# Patient Record
Sex: MALE | Race: ASIAN | Employment: OTHER | URBAN - METROPOLITAN AREA
[De-identification: names, ages, dates, MRNs, and addresses within clinical notes are randomized per-mention and may not be internally consistent; named-entity substitution may affect disease eponyms.]

---

## 2017-11-07 ENCOUNTER — OFFICE VISIT (OUTPATIENT)
Dept: FAMILY MEDICINE CLINIC | Facility: CLINIC | Age: 72
End: 2017-11-07

## 2017-11-07 VITALS
HEIGHT: 69.69 IN | DIASTOLIC BLOOD PRESSURE: 82 MMHG | SYSTOLIC BLOOD PRESSURE: 132 MMHG | OXYGEN SATURATION: 95 % | HEART RATE: 113 BPM | WEIGHT: 179 LBS | BODY MASS INDEX: 25.92 KG/M2

## 2017-11-07 DIAGNOSIS — Z12.11 SCREENING FOR COLON CANCER: ICD-10-CM

## 2017-11-07 DIAGNOSIS — M1A.09X0 IDIOPATHIC CHRONIC GOUT OF MULTIPLE SITES WITHOUT TOPHUS: ICD-10-CM

## 2017-11-07 DIAGNOSIS — R73.01 IMPAIRED FASTING GLUCOSE: Primary | ICD-10-CM

## 2017-11-07 DIAGNOSIS — Z13.6 SCREENING FOR CARDIOVASCULAR CONDITION: ICD-10-CM

## 2017-11-07 DIAGNOSIS — Z00.00 ENCOUNTER FOR ANNUAL HEALTH EXAMINATION: ICD-10-CM

## 2017-11-07 DIAGNOSIS — N18.30 CHRONIC RENAL FAILURE, STAGE 3 (MODERATE) (HCC): ICD-10-CM

## 2017-11-07 DIAGNOSIS — Z51.81 MEDICATION MONITORING ENCOUNTER: ICD-10-CM

## 2017-11-07 PROCEDURE — 99213 OFFICE O/P EST LOW 20 MIN: CPT | Performed by: FAMILY MEDICINE

## 2017-11-07 PROCEDURE — G0438 PPPS, INITIAL VISIT: HCPCS | Performed by: FAMILY MEDICINE

## 2017-11-07 NOTE — PATIENT INSTRUCTIONS
Recommend blood tests for cholesterol, sugar, uric acid, liver & kidneys, blood count. FIT (fecal or stool occult blood test) once a year for at least 3 years to screen for colon cancer.   Pneumovax (pneumonia booster) next year    With vegetarian diet and is not a screening covered service except at the Welcome to Medicare Visit    Abdominal aortic aneurysm screening (once between ages 73-68)  No results found for this or any previous visit.  Limited to patients who meet one of the following criteria:   • Me End-stage renal disease   Hemophiliacs who received Factor VIII or IX concentrates   Clients of institutions for the mentally retarded   Persons who live in the same house as a HepB virus carrier   Homosexual men   Illicit injectable drug abusers     Tet

## 2017-11-07 NOTE — PROGRESS NOTES
HPI:   Cesario Bowles is a 67year old male who presents for a Medicare Initial Annual Wellness visit (Once after 12 month Medicare anniversary) .     Recently back from [place], gout when out of town, starts allopurinol 1-2 weeks before travel, averages 9/8/15); and cataract (Left, 12/8/15). His family history is not on file. SOCIAL HISTORY:   He  reports that he has quit smoking. He has never used smokeless tobacco. He reports that he drinks alcohol. He reports that he does not use drugs.      REVIEW midline, mucosa normal, no drainage or sinus tenderness   Throat: Lips, mucosa, and tongue normal; teeth and gums normal   Neck: Supple, symmetrical, trachea midline, no adenopathy, thyroid: not enlarged, symmetric, no tenderness/mass/nodules, no carotid b LIPID PANEL; Future    Encounter for annual health examination    Screening for colon cancer  -     OCCULT BLOOD, FECAL, IMMUNOASSAY; Future         Mr. Vijay Moon does not currently take aspirin. We discussed the risks and benefits of aspirin therapy.    Sh Functional Status     Hearing Problems?: No    Vision Problems? : No    Difficulty walking?: No    Difficulty dressing or bathing?: No    Problems with daily activities? : No    Memory Problems?: No      Fall/Risk Assessment          Have you fallen in or Procedure   Diabetes Screening      HbgA1C     At Least  Annually for Diabetics No results found for: A1C No flowsheet data found.     Fasting Blood Sugar (FSB)   Patient must be diagnosed with one of the following:   • Hypertension   • Dyslipidemia   • No results found for: FOB, OCCULTSTOOL No flowsheet data found.      Barium Enema-   uncomfortable but covered  Covered but uncomfortable   Glaucoma Screening      Ophthalmology Visit   Covered annually for Diabetics, people with Glaucoma family history,  A

## 2017-11-08 RX ORDER — INFLUENZA A VIRUSA/MICHIGAN/45/2015 X-275 (H1N1) ANTIGEN (FORMALDEHYDE INACTIVATED), INFLUENZA A VIRUS A/HONG KONG/4801/2014 X-263B (H3N2) ANTIGEN (FORMALDEHYDE INACTIVATED), AND INFLUENZA B VIRUS B/BRISBANE/60/2008 ANTIGEN (FORMALDEHYDE INACTIVATED) 60; 60; 60 UG/.5ML; UG/.5ML; UG/.5ML
INJECTION, SUSPENSION INTRAMUSCULAR
Refills: 0 | COMMUNITY
Start: 2017-09-11 | End: 2017-11-08 | Stop reason: ALTCHOICE

## 2017-11-08 RX ORDER — PNEUMOCOCCAL 13-VALENT CONJUGATE VACCINE 2.2; 2.2; 2.2; 2.2; 2.2; 4.4; 2.2; 2.2; 2.2; 2.2; 2.2; 2.2; 2.2 UG/.5ML; UG/.5ML; UG/.5ML; UG/.5ML; UG/.5ML; UG/.5ML; UG/.5ML; UG/.5ML; UG/.5ML; UG/.5ML; UG/.5ML; UG/.5ML; UG/.5ML
INJECTION, SUSPENSION INTRAMUSCULAR
Refills: 0 | COMMUNITY
Start: 2017-09-11 | End: 2017-11-08 | Stop reason: ALTCHOICE

## 2018-01-02 ENCOUNTER — LAB ENCOUNTER (OUTPATIENT)
Dept: LAB | Facility: HOSPITAL | Age: 73
End: 2018-01-02
Attending: FAMILY MEDICINE
Payer: MEDICARE

## 2018-01-02 DIAGNOSIS — N18.30 CHRONIC RENAL FAILURE, STAGE 3 (MODERATE) (HCC): ICD-10-CM

## 2018-01-02 DIAGNOSIS — Z13.6 SCREENING FOR CARDIOVASCULAR CONDITION: ICD-10-CM

## 2018-01-02 DIAGNOSIS — R73.01 IMPAIRED FASTING GLUCOSE: ICD-10-CM

## 2018-01-02 DIAGNOSIS — M1A.09X0 IDIOPATHIC CHRONIC GOUT OF MULTIPLE SITES WITHOUT TOPHUS: ICD-10-CM

## 2018-01-02 LAB
ALBUMIN SERPL-MCNC: 3.5 G/DL (ref 3.5–4.8)
ALP LIVER SERPL-CCNC: 72 U/L (ref 45–117)
ALT SERPL-CCNC: 25 U/L (ref 17–63)
AST SERPL-CCNC: 20 U/L (ref 15–41)
BASOPHILS # BLD AUTO: 0.08 X10(3) UL (ref 0–0.1)
BASOPHILS NFR BLD AUTO: 1.1 %
BILIRUB SERPL-MCNC: 1.1 MG/DL (ref 0.1–2)
BUN BLD-MCNC: 13 MG/DL (ref 8–20)
CALCIUM BLD-MCNC: 9.3 MG/DL (ref 8.3–10.3)
CHLORIDE: 107 MMOL/L (ref 101–111)
CHOLEST SMN-MCNC: 214 MG/DL (ref ?–200)
CO2: 27 MMOL/L (ref 22–32)
CREAT BLD-MCNC: 1.55 MG/DL (ref 0.7–1.3)
EOSINOPHIL # BLD AUTO: 0.27 X10(3) UL (ref 0–0.3)
EOSINOPHIL NFR BLD AUTO: 3.7 %
ERYTHROCYTE [DISTWIDTH] IN BLOOD BY AUTOMATED COUNT: 12.9 % (ref 11.5–16)
GLUCOSE BLD-MCNC: 101 MG/DL (ref 70–99)
HCT VFR BLD AUTO: 44 % (ref 37–53)
HDLC SERPL-MCNC: 44 MG/DL (ref 45–?)
HDLC SERPL: 4.86 {RATIO} (ref ?–4.97)
HGB BLD-MCNC: 14.5 G/DL (ref 13–17)
IMMATURE GRANULOCYTE COUNT: 0.03 X10(3) UL (ref 0–1)
IMMATURE GRANULOCYTE RATIO %: 0.4 %
LDLC SERPL CALC-MCNC: 125 MG/DL (ref ?–130)
LYMPHOCYTES # BLD AUTO: 2.37 X10(3) UL (ref 0.9–4)
LYMPHOCYTES NFR BLD AUTO: 32.7 %
M PROTEIN MFR SERPL ELPH: 7.7 G/DL (ref 6.1–8.3)
MCH RBC QN AUTO: 31.3 PG (ref 27–33.2)
MCHC RBC AUTO-ENTMCNC: 33 G/DL (ref 31–37)
MCV RBC AUTO: 94.8 FL (ref 80–99)
MONOCYTES # BLD AUTO: 0.68 X10(3) UL (ref 0.1–0.6)
MONOCYTES NFR BLD AUTO: 9.4 %
NEUTROPHIL ABS PRELIM: 3.81 X10 (3) UL (ref 1.3–6.7)
NEUTROPHILS # BLD AUTO: 3.81 X10(3) UL (ref 1.3–6.7)
NEUTROPHILS NFR BLD AUTO: 52.7 %
NONHDLC SERPL-MCNC: 170 MG/DL (ref ?–130)
PLATELET # BLD AUTO: 249 10(3)UL (ref 150–450)
POTASSIUM SERPL-SCNC: 4.5 MMOL/L (ref 3.6–5.1)
RBC # BLD AUTO: 4.64 X10(6)UL (ref 3.8–5.8)
RED CELL DISTRIBUTION WIDTH-SD: 44.4 FL (ref 35.1–46.3)
SODIUM SERPL-SCNC: 141 MMOL/L (ref 136–144)
TRIGL SERPL-MCNC: 225 MG/DL (ref ?–150)
URIC ACID: 7.2 MG/DL (ref 2.4–8.7)
VLDLC SERPL CALC-MCNC: 45 MG/DL (ref 5–40)
WBC # BLD AUTO: 7.2 X10(3) UL (ref 4–13)

## 2018-01-02 PROCEDURE — 80053 COMPREHEN METABOLIC PANEL: CPT

## 2018-01-02 PROCEDURE — 84550 ASSAY OF BLOOD/URIC ACID: CPT

## 2018-01-02 PROCEDURE — 85025 COMPLETE CBC W/AUTO DIFF WBC: CPT

## 2018-01-02 PROCEDURE — 36415 COLL VENOUS BLD VENIPUNCTURE: CPT

## 2018-01-02 PROCEDURE — 80061 LIPID PANEL: CPT

## 2018-01-19 ENCOUNTER — LAB ENCOUNTER (OUTPATIENT)
Dept: LAB | Facility: HOSPITAL | Age: 73
End: 2018-01-19
Attending: FAMILY MEDICINE
Payer: MEDICARE

## 2018-01-19 DIAGNOSIS — Z12.11 SCREENING FOR COLON CANCER: ICD-10-CM

## 2018-01-19 PROCEDURE — 82270 OCCULT BLOOD FECES: CPT

## 2018-01-19 PROCEDURE — 82272 OCCULT BLD FECES 1-3 TESTS: CPT

## 2018-02-06 ENCOUNTER — TELEPHONE (OUTPATIENT)
Dept: FAMILY MEDICINE CLINIC | Facility: CLINIC | Age: 73
End: 2018-02-06

## 2019-05-14 ENCOUNTER — OFFICE VISIT (OUTPATIENT)
Dept: FAMILY MEDICINE CLINIC | Facility: CLINIC | Age: 74
End: 2019-05-14
Payer: MEDICARE

## 2019-05-14 VITALS
DIASTOLIC BLOOD PRESSURE: 84 MMHG | RESPIRATION RATE: 16 BRPM | BODY MASS INDEX: 25.24 KG/M2 | HEART RATE: 71 BPM | WEIGHT: 176.31 LBS | SYSTOLIC BLOOD PRESSURE: 138 MMHG | TEMPERATURE: 98 F | HEIGHT: 70 IN | OXYGEN SATURATION: 99 %

## 2019-05-14 DIAGNOSIS — M1A.0711 IDIOPATHIC CHRONIC GOUT OF RIGHT FOOT WITH TOPHUS: Primary | ICD-10-CM

## 2019-05-14 PROBLEM — M1A.0791 IDIOPATHIC CHRONIC GOUT OF FOOT WITH TOPHUS: Status: ACTIVE | Noted: 2017-11-07

## 2019-05-14 PROCEDURE — 99213 OFFICE O/P EST LOW 20 MIN: CPT | Performed by: FAMILY MEDICINE

## 2019-05-14 RX ORDER — COLCHICINE 0.6 MG/1
TABLET ORAL
Qty: 3 TABLET | Refills: 1 | Status: SHIPPED | OUTPATIENT
Start: 2019-05-14 | End: 2019-05-22

## 2019-05-14 NOTE — PATIENT INSTRUCTIONS
Colchicine 2 pills once and another tablet 1 hour later. Referral to Dr. Lyn Ayala to discuss treatment of gout and prevention of severe attacks. Treating Gout Attacks     Raising the joint above the level of your heart can help reduce gout symptoms. do:  · Don't eat foods high in purines  ? Certain meats (red meat, processed meat, turkey)  ? Organ meats (kidney, liver, sweetbread)  ? Shellfish (lobster, crab, shrimp, scallop, mussel)  ?  Certain fish (anchovy, sardine, herring, mackerel)  · Take any me and gravies made with meat  · Organ meats (such as liver, kidneys, sweetbreads, and tripe)  · Legumes (such as dried beans and peas)  · Mushrooms, spinach, asparagus, and cauliflower  · Yeast and yeast extract supplements  Foods to try  Some foods may be h

## 2019-05-14 NOTE — PROGRESS NOTES
Joseph Saucedo IS A 68year old male HERE FOR Patient presents with:  Gout: F/U. x2 months        History of present illness:     Had attack 4 months ago of gout in January. Started when he was here. Swelling developed and has persisted since March. Financial resource strain: Not on file      Food insecurity:        Worry: Not on file        Inability: Not on file      Transportation needs:        Medical: Not on file        Non-medical: Not on file    Tobacco Use      Smoking status: Former Smoke A fib was 6 years ao with emergency surgery for gastric ulcer perforated at [facility]. Caused by INdomethacin taken for gout.      Exam:     /84   Pulse 71   Temp 98.2 °F (36.8 °C) (Oral)   Resp 16   Ht 70\"   Wt 176 lb 4.8 oz   SpO2 99%   BMI Gout is a disease that affects the joints. It is caused by excess uric acid in your blood that may lead to crystals forming in your joints. Left untreated, it can lead to painful foot and joint deformities and even kidney problems.  But, by treating gout ea · Take any medicines prescribed by your healthcare provider. · Lose weight if you need to. · Reduce high fructose corn syrup in meals and drinks. · Reduce or cut out alcohol, particularly beer, but also red wine and spirits.   · Control blood pressure, d Some foods may be helpful for people with gout. You may want to try adding some of the following foods to your diet:  · Dark berries. These include blueberries, blackberries, and cherries. These berries contain chemicals that may lower uric acid. · Tofu.

## 2019-05-15 ENCOUNTER — TELEPHONE (OUTPATIENT)
Dept: FAMILY MEDICINE CLINIC | Facility: CLINIC | Age: 74
End: 2019-05-15

## 2019-05-15 ENCOUNTER — LAB ENCOUNTER (OUTPATIENT)
Dept: LAB | Facility: HOSPITAL | Age: 74
End: 2019-05-15
Attending: FAMILY MEDICINE
Payer: MEDICARE

## 2019-05-15 DIAGNOSIS — M1A.0711 IDIOPATHIC CHRONIC GOUT OF RIGHT FOOT WITH TOPHUS: ICD-10-CM

## 2019-05-15 PROCEDURE — 84550 ASSAY OF BLOOD/URIC ACID: CPT

## 2019-05-15 PROCEDURE — 36415 COLL VENOUS BLD VENIPUNCTURE: CPT

## 2019-05-15 PROCEDURE — 80053 COMPREHEN METABOLIC PANEL: CPT

## 2019-05-15 PROCEDURE — 85025 COMPLETE CBC W/AUTO DIFF WBC: CPT

## 2019-05-15 NOTE — TELEPHONE ENCOUNTER
Returned call to patient. He states he was surprised at the way the colchicine was ordered. He does not think it is going to help him to take 2 pills followed by 1 pill an hour later. States he previously took colchicine 3 pills a day x10 days.  Reviewed

## 2019-05-16 RX ORDER — COLCHICINE 0.6 MG/1
0.6 TABLET ORAL 2 TIMES DAILY
Qty: 60 TABLET | Refills: 1 | Status: SHIPPED | OUTPATIENT
Start: 2019-05-16 | End: 2019-05-22

## 2019-05-16 NOTE — TELEPHONE ENCOUNTER
The current recommendation for acute gout (which he does not have particularly, but has had persistent aching and swelling) is 2 pills once followed by a pill an hour later.  I had wante to try the one-time dose first. It has less nausea and toxicity with i

## 2019-05-20 ENCOUNTER — PATIENT MESSAGE (OUTPATIENT)
Dept: FAMILY MEDICINE CLINIC | Facility: CLINIC | Age: 74
End: 2019-05-20

## 2019-05-20 DIAGNOSIS — E79.0 HYPERURICEMIA: Primary | ICD-10-CM

## 2019-05-20 DIAGNOSIS — M1A.9XX1 TOPHACEOUS GOUT: ICD-10-CM

## 2019-05-20 NOTE — PROGRESS NOTES
Uloric is similar to allopurinol and might cause acute attack similarly. Please tell pt I'll forward a query to the rheumatologist asking how his case should be handled.  Let him know I've been out of office for personal reasons and not able to check messag

## 2019-05-20 NOTE — TELEPHONE ENCOUNTER
Spoke to patient and reviewed information per Dr. Brito Mon note. He still has questions. See [employer] message 5/20/19.

## 2019-05-20 NOTE — TELEPHONE ENCOUNTER
Dr. Barbara Aguilera,  Please advise    I spoke to patient and based on your response regarding potential toxicity of colchicine, he was hesitant to take it BID for 30 days. Per pharmacy, it will cost approx. $320 out of pocket for the one month supply.  Wants to ve

## 2019-05-20 NOTE — TELEPHONE ENCOUNTER
From: Neha Wood  To: Lakia Vega MD  Sent: 5/20/2019 8:19 AM CDT  Subject: Visit Follow-up Question    I had left a message on Friday morning for a call-back from a nurse; but I did not get any call.  Please call me back - [place]

## 2019-05-22 RX ORDER — COLCHICINE 0.6 MG/1
0.3 TABLET ORAL DAILY
Qty: 30 TABLET | Refills: 1 | COMMUNITY
Start: 2019-05-22 | End: 2019-12-10

## 2019-05-29 ENCOUNTER — PATIENT MESSAGE (OUTPATIENT)
Dept: FAMILY MEDICINE CLINIC | Facility: CLINIC | Age: 74
End: 2019-05-29

## 2019-05-29 DIAGNOSIS — M10.00 IDIOPATHIC GOUT, UNSPECIFIED CHRONICITY, UNSPECIFIED SITE: Primary | ICD-10-CM

## 2019-05-29 DIAGNOSIS — N18.30 CHRONIC RENAL FAILURE, STAGE 3 (MODERATE) (HCC): ICD-10-CM

## 2019-05-29 NOTE — TELEPHONE ENCOUNTER
From: Jeronimo People  Sent: 5/29/2019 2:47 PM CDT  To: [facility] Clinical Staff  Subject: RE:final recommendation from Dr. Nahed Lopes, similar to what I told you to do. Dr. Harper No: Thanks for the follow-up.  Yes, I have been taking 150 mg Alopurinol; wi

## 2019-06-04 ENCOUNTER — TELEPHONE (OUTPATIENT)
Dept: FAMILY MEDICINE CLINIC | Facility: CLINIC | Age: 74
End: 2019-06-04

## 2019-06-12 NOTE — TELEPHONE ENCOUNTER
Dr. Sejal Pretty,  [facility]    I do not see report from Dr. Enzo Aguilar in the chart. Have you received it?

## 2019-08-13 ENCOUNTER — APPOINTMENT (OUTPATIENT)
Dept: LAB | Facility: HOSPITAL | Age: 74
End: 2019-08-13
Attending: FAMILY MEDICINE
Payer: MEDICARE

## 2019-08-13 DIAGNOSIS — M1A.9XX1 TOPHACEOUS GOUT: ICD-10-CM

## 2019-08-13 DIAGNOSIS — M10.00 IDIOPATHIC GOUT, UNSPECIFIED CHRONICITY, UNSPECIFIED SITE: ICD-10-CM

## 2019-08-13 DIAGNOSIS — E79.0 HYPERURICEMIA: ICD-10-CM

## 2019-08-13 DIAGNOSIS — N18.30 CHRONIC RENAL FAILURE, STAGE 3 (MODERATE) (HCC): ICD-10-CM

## 2019-08-13 LAB
CREAT BLD-MCNC: 1.67 MG/DL (ref 0.7–1.3)
URATE SERPL-MCNC: 4.4 MG/DL (ref 3.5–7.2)

## 2019-08-13 PROCEDURE — 36415 COLL VENOUS BLD VENIPUNCTURE: CPT

## 2019-08-13 PROCEDURE — 84550 ASSAY OF BLOOD/URIC ACID: CPT

## 2019-08-13 PROCEDURE — 82565 ASSAY OF CREATININE: CPT

## 2019-11-25 ENCOUNTER — TELEPHONE (OUTPATIENT)
Dept: FAMILY MEDICINE CLINIC | Facility: CLINIC | Age: 74
End: 2019-11-25

## 2019-11-25 DIAGNOSIS — M1A.0791 IDIOPATHIC CHRONIC GOUT OF FOOT WITH TOPHUS, UNSPECIFIED LATERALITY: Primary | ICD-10-CM

## 2019-11-25 DIAGNOSIS — N18.30 CHRONIC RENAL FAILURE, STAGE 3 (MODERATE) (HCC): ICD-10-CM

## 2019-11-25 NOTE — TELEPHONE ENCOUNTER
Pt called stating he has an appointment with a Urologist in December. He is requesting orders to be entered for \"Creatinine Serum & Uric Acid\". Wants it done so results can be compared to  the August blood draw.

## 2019-11-25 NOTE — TELEPHONE ENCOUNTER
Dr. Erika Beth,  Please advise    LOV  5/14/19    Last Lab 8/13/19    Future Appointments   Date Time Provider Peter Hannon   12/10/2019  1:45 PM Tor Samuel, DO [facility]

## 2019-12-03 ENCOUNTER — APPOINTMENT (OUTPATIENT)
Dept: LAB | Facility: HOSPITAL | Age: 74
End: 2019-12-03
Attending: FAMILY MEDICINE
Payer: MEDICARE

## 2019-12-03 DIAGNOSIS — M1A.0791 IDIOPATHIC CHRONIC GOUT OF FOOT WITH TOPHUS, UNSPECIFIED LATERALITY: ICD-10-CM

## 2019-12-03 DIAGNOSIS — N18.30 CHRONIC RENAL FAILURE, STAGE 3 (MODERATE) (HCC): ICD-10-CM

## 2019-12-03 PROCEDURE — 80048 BASIC METABOLIC PNL TOTAL CA: CPT

## 2019-12-03 PROCEDURE — 36415 COLL VENOUS BLD VENIPUNCTURE: CPT

## 2019-12-03 PROCEDURE — 84550 ASSAY OF BLOOD/URIC ACID: CPT

## 2019-12-10 ENCOUNTER — OFFICE VISIT (OUTPATIENT)
Dept: RHEUMATOLOGY | Facility: CLINIC | Age: 74
End: 2019-12-10
Payer: MEDICARE

## 2019-12-10 VITALS
TEMPERATURE: 98 F | BODY MASS INDEX: 25 KG/M2 | SYSTOLIC BLOOD PRESSURE: 124 MMHG | HEART RATE: 72 BPM | RESPIRATION RATE: 16 BRPM | WEIGHT: 175 LBS | DIASTOLIC BLOOD PRESSURE: 82 MMHG

## 2019-12-10 DIAGNOSIS — E79.0 HYPERURICEMIA: ICD-10-CM

## 2019-12-10 DIAGNOSIS — N18.9 CHRONIC KIDNEY DISEASE, UNSPECIFIED CKD STAGE: ICD-10-CM

## 2019-12-10 DIAGNOSIS — M1A.9XX1 TOPHACEOUS GOUT: Primary | ICD-10-CM

## 2019-12-10 PROCEDURE — 99204 OFFICE O/P NEW MOD 45 MIN: CPT | Performed by: INTERNAL MEDICINE

## 2019-12-10 NOTE — PROGRESS NOTES
RHEUMATOLOGY NEW PATIENT   Date of visit: 12/10/2019  ? Patient presents with:  Establish Care: NP ref by PCP for gout. Pt states 'has first gout attack in 1987. No meat, sea food. '' No fam hx autoimmune diseases      ASSESSMENT, DISCUSSION & PLAN   As cysts.  This may be contributing to his ability to renally excrete uric acid. We will get updated uric acid levels in 1 month as well as renal function.   Depending on test results will likely recommend staying on chronic urate lowering therapy, amadeo the prednisone. States the January flare took about 3 weeks to subside. Has been flare free since that time. Since getting his last set of blood tests, showing improvement of uric acid, pt self discontinued Uloric.  Stopped allopurinol around August m Medications    No medications on file     Medications Discontinued During This Encounter  colchicine 0.6 MG Oral Tab             ? ?  Allergies:    Nsaids                    ? REVIEW OF SYSTEMS   ?   Review of Systems   Constitutional: Negative for chills intact distal pulses. No murmur heard. Pulmonary/Chest: Effort normal and breath sounds normal. No respiratory distress. He has no wheezes. Abdominal: Soft. He exhibits no distension. Musculoskeletal:         General: Deformity present.  No tenderness abdominal aorta tapers to the bifurcation. No retroperitoneal adenopathy.  Scattered atherosclerosis     PELVIS:  Not included     OTHER:  Lumbar scoliosis and spondyloarthritis, most severe at the lower lumbar levels    Small hiatal hernia.           =====

## 2020-01-07 ENCOUNTER — TELEPHONE (OUTPATIENT)
Dept: RHEUMATOLOGY | Facility: CLINIC | Age: 75
End: 2020-01-07

## 2020-01-07 ENCOUNTER — APPOINTMENT (OUTPATIENT)
Dept: LAB | Facility: HOSPITAL | Age: 75
End: 2020-01-07
Attending: INTERNAL MEDICINE
Payer: MEDICARE

## 2020-01-07 DIAGNOSIS — M1A.9XX1 TOPHACEOUS GOUT: Primary | ICD-10-CM

## 2020-01-07 DIAGNOSIS — M1A.9XX1 TOPHACEOUS GOUT: ICD-10-CM

## 2020-01-07 DIAGNOSIS — E79.0 HYPERURICEMIA: ICD-10-CM

## 2020-01-07 DIAGNOSIS — N18.9 CHRONIC KIDNEY DISEASE, UNSPECIFIED CKD STAGE: ICD-10-CM

## 2020-01-07 LAB
CREAT BLD-MCNC: 1.62 MG/DL (ref 0.7–1.3)
URATE SERPL-MCNC: 8 MG/DL (ref 3.5–7.2)

## 2020-01-07 PROCEDURE — 82565 ASSAY OF CREATININE: CPT

## 2020-01-07 PROCEDURE — 84550 ASSAY OF BLOOD/URIC ACID: CPT

## 2020-01-07 PROCEDURE — 36415 COLL VENOUS BLD VENIPUNCTURE: CPT

## 2020-01-07 NOTE — TELEPHONE ENCOUNTER
Called patient and notified of test results. Serum uric acid is elevated at 8.0. Reminded patient as discussed at his last visit that I would recommend urate lowering therapy at this level of hyperuricemia.   Reminded patient of risks and benefits of

## 2020-01-23 ENCOUNTER — OFFICE VISIT (OUTPATIENT)
Dept: FAMILY MEDICINE CLINIC | Facility: CLINIC | Age: 75
End: 2020-01-23
Payer: MEDICARE

## 2020-01-23 VITALS
RESPIRATION RATE: 16 BRPM | TEMPERATURE: 98 F | DIASTOLIC BLOOD PRESSURE: 96 MMHG | HEIGHT: 69.5 IN | WEIGHT: 179 LBS | BODY MASS INDEX: 25.92 KG/M2 | HEART RATE: 73 BPM | SYSTOLIC BLOOD PRESSURE: 144 MMHG | OXYGEN SATURATION: 99 %

## 2020-01-23 DIAGNOSIS — M10.40 OTHER SECONDARY ACUTE GOUT, UNSPECIFIED SITE: ICD-10-CM

## 2020-01-23 DIAGNOSIS — Z86.69 HISTORY OF RETINAL DETACHMENT: ICD-10-CM

## 2020-01-23 DIAGNOSIS — N28.1 RENAL CYSTS, ACQUIRED, BILATERAL: ICD-10-CM

## 2020-01-23 DIAGNOSIS — R73.01 IMPAIRED FASTING GLUCOSE: ICD-10-CM

## 2020-01-23 DIAGNOSIS — N18.30 CHRONIC RENAL FAILURE, STAGE 3 (MODERATE) (HCC): ICD-10-CM

## 2020-01-23 DIAGNOSIS — Z00.00 ENCOUNTER FOR ANNUAL HEALTH EXAMINATION: Primary | ICD-10-CM

## 2020-01-23 DIAGNOSIS — Z86.79 HISTORY OF VENTRICULAR TACHYCARDIA: ICD-10-CM

## 2020-01-23 DIAGNOSIS — Z87.11 HISTORY OF PEPTIC ULCER DISEASE: ICD-10-CM

## 2020-01-23 DIAGNOSIS — M1A.0791 IDIOPATHIC CHRONIC GOUT OF FOOT WITH TOPHUS, UNSPECIFIED LATERALITY: ICD-10-CM

## 2020-01-23 DIAGNOSIS — Z86.79 HISTORY OF ATRIAL FIBRILLATION: ICD-10-CM

## 2020-01-23 DIAGNOSIS — Z12.5 PROSTATE CANCER SCREENING: ICD-10-CM

## 2020-01-23 DIAGNOSIS — Z13.6 SCREENING FOR CARDIOVASCULAR CONDITION: ICD-10-CM

## 2020-01-23 PROCEDURE — G0439 PPPS, SUBSEQ VISIT: HCPCS | Performed by: FAMILY MEDICINE

## 2020-01-23 PROCEDURE — 96160 PT-FOCUSED HLTH RISK ASSMT: CPT | Performed by: FAMILY MEDICINE

## 2020-01-23 PROCEDURE — 99397 PER PM REEVAL EST PAT 65+ YR: CPT | Performed by: FAMILY MEDICINE

## 2020-01-23 RX ORDER — COLCHICINE 0.6 MG/1
0.3 TABLET ORAL DAILY
Qty: 1 TABLET | Refills: 0 | Status: SHIPPED | OUTPATIENT
Start: 2020-01-23 | End: 2020-06-18

## 2020-01-23 NOTE — PROGRESS NOTES
HPI:   Nora Meyers is a 76year old male who presents for a MA (Medicare Advantage) Supervisit (Once per calendar year).     Here for well visit  His last annual assessment has been over 1 year: Annual Physical due on 11/07/2018       Sister turned rheumatologist in [facility], now changed back to here. Uric acid in August was 5.2, Took allopurinol and colchicine together x 3 months. Now on colchicine 1/2 tab daily and 100 mg allopurinol.         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      EXAM:   /80   Pulse 73   Temp 97.8 °F (36.6 °C) (Temporal)   Resp 16   Ht 69.5\"   Wt 179 lb (81.2 kg)   SpO2 99%   BMI 26.05 kg/m²   Estimated body mass index is 26.05 kg/m² as calculated from the defer   declines rectal exam now.    Vaccination History     Immunization History   Administered Date(s) Administered   • FLU VACC High Dose 65 YRS & Older PRSV Free (33966) 10/12/2016, 09/11/2017, 10/23/2018, 11/26/2019   • Pneumococcal (Prevnar 13) 09/11/ visit. No flowsheet data found. Fecal Occult Blood Annually Occult Blood Result (no units)   Date Value   01/19/2018 Negative for Occult Blood    No flowsheet data found.     Glaucoma Screening      Ophthalmology Visit Annually: Diabetics, FHx Glaucoma, with slightly high levels don't actually have cancer, and most with cancer don't need aggressive treatment. We stop screening at 75.).     Consider Shingrix, 2-shot series 2 to 6 months apart to protect against shingles (98% effective), available at [facility] Abdominal aortic aneurysm screening (once between ages 73-68)  No results found for this or any previous visit.  Limited to patients who meet one of the following criteria:   • Men who are 73-68 years old and have smoked more than 100 cigarettes in their who received Factor VIII or IX concentrates   Clients of institutions for the mentally retarded   Persons who live in the same house as a HepB virus carrier   Homosexual men   Illicit injectable drug abusers     Tetanus Toxoid- Only covered with a cut with

## 2020-01-23 NOTE — PATIENT INSTRUCTIONS
Blood pressure high in our office today, recheck in 2-3 weeks to see if back to usual range. Consider cologuard if covered, check with insurance. Otherwise we recommend annual check on stool or periodic colonoscopy for colon cancer screening.      Check covered every 5 yrs including Total, LDL and Trigs LDL Cholesterol (mg/dL)   Date Value   01/02/2018 125     Cholesterol, Total (mg/dL)   Date Value   01/02/2018 214 (H)     Triglycerides (mg/dL)   Date Value   01/02/2018 225 (H)        EKG - covered if ne every year    Pneumococcal 13 (Prevnar)  Covered Once after 65 No orders found for this or any previous visit. Please get once after your 65th birthday    Pneumococcal 23 (Pneumovax)  Covered Once after 65 No orders found for this or any previous visit.  Pl

## 2020-02-05 ENCOUNTER — OFFICE VISIT (OUTPATIENT)
Dept: FAMILY MEDICINE CLINIC | Facility: CLINIC | Age: 75
End: 2020-02-05
Payer: MEDICARE

## 2020-02-05 ENCOUNTER — TELEPHONE (OUTPATIENT)
Dept: FAMILY MEDICINE CLINIC | Facility: CLINIC | Age: 75
End: 2020-02-05

## 2020-02-05 VITALS
RESPIRATION RATE: 16 BRPM | BODY MASS INDEX: 26.06 KG/M2 | OXYGEN SATURATION: 99 % | HEART RATE: 64 BPM | TEMPERATURE: 98 F | WEIGHT: 180 LBS | SYSTOLIC BLOOD PRESSURE: 122 MMHG | DIASTOLIC BLOOD PRESSURE: 88 MMHG | HEIGHT: 69.5 IN

## 2020-02-05 DIAGNOSIS — R04.0 EPISTAXIS: Primary | ICD-10-CM

## 2020-02-05 PROCEDURE — 99212 OFFICE O/P EST SF 10 MIN: CPT | Performed by: FAMILY MEDICINE

## 2020-02-05 NOTE — PROGRESS NOTES
Erin Laurent IS A 76year old male HERE FOR Patient presents with:  Nose Bleed: after every meal        History of present illness:     Bled 3 times yesterday after breakfast lunch dinner, this AM again after 45 minutes.  L side of nose    Past histo Alcohol use: Yes        Comment: OCCAS 1 beer per week or less      Drug use: No      Sexual activity: Not on file    Lifestyle      Physical activity:        Days per week: Not on file        Minutes per session: Not on file      Stress: Not on file The skin inside your nose is fragile and filled with blood vessels. That's why even a slight injury to your nose sometimes may cause bleeding. Hard nose blowing, dry winter air, colds, and nose-picking can also cause nosebleeds.  Medicines such as warfarin, · If the bleeding starts again, sit up and lean forward to prevent swallowing blood. Pinch your nose tightly on both sides for 10 to 15 minutes. Time yourself. Don’t release the pressure on your nose until 10 minutes is up.  If bleeding doesn't stop, contin

## 2020-02-05 NOTE — PATIENT INSTRUCTIONS
Refer urgently Dr. Spaulding Sample office to see if nose needs to be cauterized. Nosebleed  The skin inside your nose is fragile and filled with blood vessels. That's why even a slight injury to your nose sometimes may cause bleeding.  Hard nose blowing, dry win take acetaminophen for pain, unless another pain medicine was prescribed. · If the bleeding starts again, sit up and lean forward to prevent swallowing blood. Pinch your nose tightly on both sides for 10 to 15 minutes. Time yourself.  Don’t release the pre

## 2020-02-05 NOTE — TELEPHONE ENCOUNTER
Pt calling stating since yesterday after eating his nose has been eating (after eating only).   Please advise

## 2020-02-05 NOTE — TELEPHONE ENCOUNTER
Spoke to patient who states since yesterday he has been having nose bleeds after eating. States this am it started about one hour after bkfst and seem like a lot. Bleeding is stopped now. States air in house is a little dry, humidifier is broken.   He is

## 2020-02-12 ENCOUNTER — APPOINTMENT (OUTPATIENT)
Dept: LAB | Facility: HOSPITAL | Age: 75
End: 2020-02-12
Attending: FAMILY MEDICINE
Payer: MEDICARE

## 2020-02-12 DIAGNOSIS — M1A.9XX1 TOPHACEOUS GOUT: ICD-10-CM

## 2020-02-12 DIAGNOSIS — Z13.6 SCREENING FOR CARDIOVASCULAR CONDITION: ICD-10-CM

## 2020-02-12 DIAGNOSIS — Z12.5 PROSTATE CANCER SCREENING: ICD-10-CM

## 2020-02-12 DIAGNOSIS — E79.0 HYPERURICEMIA: ICD-10-CM

## 2020-02-12 DIAGNOSIS — Z00.00 ENCOUNTER FOR ANNUAL HEALTH EXAMINATION: ICD-10-CM

## 2020-02-12 DIAGNOSIS — N18.9 CHRONIC KIDNEY DISEASE, UNSPECIFIED CKD STAGE: ICD-10-CM

## 2020-02-12 LAB
ALBUMIN SERPL-MCNC: 3.6 G/DL (ref 3.4–5)
ALBUMIN/GLOB SERPL: 0.8 {RATIO} (ref 1–2)
ALP LIVER SERPL-CCNC: 77 U/L (ref 45–117)
ALT SERPL-CCNC: 20 U/L (ref 16–61)
ANION GAP SERPL CALC-SCNC: 5 MMOL/L (ref 0–18)
AST SERPL-CCNC: 16 U/L (ref 15–37)
BILIRUB SERPL-MCNC: 1.4 MG/DL (ref 0.1–2)
BUN BLD-MCNC: 14 MG/DL (ref 7–18)
BUN/CREAT SERPL: 8.6 (ref 10–20)
CALCIUM BLD-MCNC: 8.9 MG/DL (ref 8.5–10.1)
CHLORIDE SERPL-SCNC: 107 MMOL/L (ref 98–112)
CHOLEST SMN-MCNC: 213 MG/DL (ref ?–200)
CO2 SERPL-SCNC: 27 MMOL/L (ref 21–32)
COMPLEXED PSA SERPL-MCNC: 3.54 NG/ML (ref ?–4)
CREAT BLD-MCNC: 1.62 MG/DL (ref 0.7–1.3)
EST. AVERAGE GLUCOSE BLD GHB EST-MCNC: 126 MG/DL (ref 68–126)
GLOBULIN PLAS-MCNC: 4.3 G/DL (ref 2.8–4.4)
GLUCOSE BLD-MCNC: 103 MG/DL (ref 70–99)
HBA1C MFR BLD HPLC: 6 % (ref ?–5.7)
HDLC SERPL-MCNC: 49 MG/DL (ref 40–59)
LDLC SERPL CALC-MCNC: 135 MG/DL (ref ?–100)
M PROTEIN MFR SERPL ELPH: 7.9 G/DL (ref 6.4–8.2)
NONHDLC SERPL-MCNC: 164 MG/DL (ref ?–130)
OSMOLALITY SERPL CALC.SUM OF ELEC: 289 MOSM/KG (ref 275–295)
PATIENT FASTING Y/N/NP: YES
PATIENT FASTING Y/N/NP: YES
POTASSIUM SERPL-SCNC: 3.9 MMOL/L (ref 3.5–5.1)
SODIUM SERPL-SCNC: 139 MMOL/L (ref 136–145)
TRIGL SERPL-MCNC: 146 MG/DL (ref 30–149)
URATE SERPL-MCNC: 6 MG/DL (ref 3.5–7.2)
VLDLC SERPL CALC-MCNC: 29 MG/DL (ref 0–30)

## 2020-02-12 PROCEDURE — 80061 LIPID PANEL: CPT

## 2020-02-12 PROCEDURE — 83036 HEMOGLOBIN GLYCOSYLATED A1C: CPT

## 2020-02-12 PROCEDURE — 36415 COLL VENOUS BLD VENIPUNCTURE: CPT

## 2020-02-12 PROCEDURE — 80053 COMPREHEN METABOLIC PANEL: CPT

## 2020-02-12 PROCEDURE — 84550 ASSAY OF BLOOD/URIC ACID: CPT

## 2020-02-13 DIAGNOSIS — M1A.9XX1 TOPHACEOUS GOUT: Primary | ICD-10-CM

## 2020-02-13 DIAGNOSIS — E79.0 HYPERURICEMIA: ICD-10-CM

## 2020-02-13 DIAGNOSIS — N18.9 CHRONIC KIDNEY DISEASE, UNSPECIFIED CKD STAGE: ICD-10-CM

## 2020-03-19 ENCOUNTER — PATIENT MESSAGE (OUTPATIENT)
Dept: FAMILY MEDICINE CLINIC | Facility: CLINIC | Age: 75
End: 2020-03-19

## 2020-03-20 NOTE — TELEPHONE ENCOUNTER
From: Jailene Garcia  To: Natali Mao MD  Sent: 3/19/2020 6:29 PM CDT  Subject: Other    Hello Dr. Faye Bass:    I see a Blood Test is pending for me. I assume an order is entered in the system for it?     I have to schedule an appointment in early

## 2020-05-22 ENCOUNTER — APPOINTMENT (OUTPATIENT)
Dept: LAB | Facility: HOSPITAL | Age: 75
End: 2020-05-22
Attending: INTERNAL MEDICINE
Payer: MEDICARE

## 2020-05-22 DIAGNOSIS — E79.0 HYPERURICEMIA: ICD-10-CM

## 2020-05-22 DIAGNOSIS — M1A.9XX1 TOPHACEOUS GOUT: ICD-10-CM

## 2020-05-22 DIAGNOSIS — N18.9 CHRONIC KIDNEY DISEASE, UNSPECIFIED CKD STAGE: ICD-10-CM

## 2020-05-22 PROCEDURE — 84550 ASSAY OF BLOOD/URIC ACID: CPT

## 2020-05-22 PROCEDURE — 80053 COMPREHEN METABOLIC PANEL: CPT

## 2020-05-22 PROCEDURE — 36415 COLL VENOUS BLD VENIPUNCTURE: CPT

## 2020-06-18 ENCOUNTER — TELEMEDICINE (OUTPATIENT)
Dept: RHEUMATOLOGY | Facility: CLINIC | Age: 75
End: 2020-06-18
Payer: MEDICARE

## 2020-06-18 VITALS — WEIGHT: 155 LBS | BODY MASS INDEX: 21.7 KG/M2 | HEIGHT: 71 IN

## 2020-06-18 DIAGNOSIS — E79.0 HYPERURICEMIA: ICD-10-CM

## 2020-06-18 DIAGNOSIS — M1A.9XX1 TOPHACEOUS GOUT: Primary | ICD-10-CM

## 2020-06-18 DIAGNOSIS — N18.9 CHRONIC KIDNEY DISEASE, UNSPECIFIED CKD STAGE: ICD-10-CM

## 2020-06-18 PROCEDURE — 99213 OFFICE O/P EST LOW 20 MIN: CPT | Performed by: INTERNAL MEDICINE

## 2020-06-18 NOTE — PROGRESS NOTES
[facility] TeleHealth Audio and Visual Visit     This was an audio/video conversation using D[employer] in lieu of an in-person visit due to need to limit person to person contact during the coronavirus pandemic.  The patient was within the [address] 1987 and since that time, has had flares about once every 2 years. Since January, patient has been doing well with allopurinol 100 mg. Most recent serum uric acid level was 6.5.   We will have him continue current dose as it seems to be controlling his as previously and has not had any issues with gout. Is still watching his beer intake. HPI from initial consultation  referred for rheumatologic evaluation due to gout.      States he had his first [place]" was in 26 after visiting [facility].  At that t Hx:  No family hx of gout     ?   Past Medical History:  Past Medical History:   Diagnosis Date   • Gout 1988    recurrent, started a week after trip to [facility], right   • History of atrial fibrillation 2012    postop from perforated gastric ulcer, [place], 2012 itching and rash. Neurological: Negative for dizziness, tingling, seizures, weakness and headaches. Endo/Heme/Allergies: Does not bruise/bleed easily. Psychiatric/Behavioral: Negative for depression.  The patient is not nervous/anxious and does not ha Nothing recent to review     Labs:  Lab Results   Component Value Date    WBC 8.7 05/15/2019    RBC 4.34 05/15/2019    HGB 13.6 05/15/2019    HCT 41.8 05/15/2019    .0 05/15/2019    MCV 96.3 05/15/2019    MCH 31.3 05/15/2019    MCHC 32.5 05/15/201

## 2020-10-07 ENCOUNTER — LAB ENCOUNTER (OUTPATIENT)
Dept: LAB | Facility: HOSPITAL | Age: 75
End: 2020-10-07
Attending: INTERNAL MEDICINE
Payer: MEDICARE

## 2020-10-07 DIAGNOSIS — M1A.9XX1 TOPHACEOUS GOUT: ICD-10-CM

## 2020-10-07 DIAGNOSIS — E79.0 HYPERURICEMIA: ICD-10-CM

## 2020-10-07 DIAGNOSIS — N18.9 CHRONIC KIDNEY DISEASE, UNSPECIFIED CKD STAGE: ICD-10-CM

## 2020-10-07 PROCEDURE — 36415 COLL VENOUS BLD VENIPUNCTURE: CPT

## 2020-10-07 PROCEDURE — 84550 ASSAY OF BLOOD/URIC ACID: CPT

## 2020-10-09 ENCOUNTER — OFFICE VISIT (OUTPATIENT)
Dept: RHEUMATOLOGY | Facility: CLINIC | Age: 75
End: 2020-10-09
Payer: MEDICARE

## 2020-10-09 VITALS
HEART RATE: 62 BPM | BODY MASS INDEX: 24.78 KG/M2 | SYSTOLIC BLOOD PRESSURE: 160 MMHG | HEIGHT: 71 IN | WEIGHT: 177 LBS | TEMPERATURE: 97 F | RESPIRATION RATE: 16 BRPM | DIASTOLIC BLOOD PRESSURE: 72 MMHG

## 2020-10-09 DIAGNOSIS — M1A.9XX1 TOPHACEOUS GOUT: Primary | ICD-10-CM

## 2020-10-09 DIAGNOSIS — Z13.820 SCREENING FOR OSTEOPOROSIS: ICD-10-CM

## 2020-10-09 DIAGNOSIS — R29.890 LOSS OF HEIGHT: ICD-10-CM

## 2020-10-09 DIAGNOSIS — E79.0 HYPERURICEMIA: ICD-10-CM

## 2020-10-09 DIAGNOSIS — N18.9 CHRONIC KIDNEY DISEASE, UNSPECIFIED CKD STAGE: ICD-10-CM

## 2020-10-09 PROCEDURE — 99213 OFFICE O/P EST LOW 20 MIN: CPT | Performed by: INTERNAL MEDICINE

## 2020-10-09 PROCEDURE — 3078F DIAST BP <80 MM HG: CPT | Performed by: INTERNAL MEDICINE

## 2020-10-09 PROCEDURE — 3077F SYST BP >= 140 MM HG: CPT | Performed by: INTERNAL MEDICINE

## 2020-10-09 PROCEDURE — 3008F BODY MASS INDEX DOCD: CPT | Performed by: INTERNAL MEDICINE

## 2020-10-09 RX ORDER — ALLOPURINOL 100 MG/1
100 TABLET ORAL DAILY
Qty: 90 TABLET | Refills: 1 | Status: SHIPPED | OUTPATIENT
Start: 2020-10-09 | End: 2021-03-29

## 2020-10-09 NOTE — PROGRESS NOTES
?  RHEUMATOLOGY Follow up   Date of visit: 10/9/2020  ? Patient presents with:  Gout: previous video visit. Feeling good. No symptoms and no flares    ?   ASSESSMENT, DISCUSSION & PLAN   Assessment:  Tophaceous gout  (primary encounter diagnosis)  Hyperuri MG Oral Tab; Take 1 tablet (100 mg total) by mouth daily. Hyperuricemia  -     URIC ACID, SERUM; Standing  -     allopurinol 100 MG Oral Tab; Take 1 tablet (100 mg total) by mouth daily.     Chronic kidney disease, unspecified CKD stage  -     URIC ACID, evaluated by rheumatology at [facility], was told to start Uloric. There was some miscommunication so pt was taking allopurinol 099LS daily and Urolic 77VQ daily.  Was also given prednisone instead of colchicine but states the inflammation did not go down with th comment: 45 years ago quit. Alcohol use: Yes      Comment: OCCAS 1 beer per week or less    Drug use: No    Medications:    •  allopurinol 100 MG Oral Tab, Take 1 tablet (100 mg total) by mouth daily. , Disp: 90 tablet, Rfl: 1      Modified Medications No distress. HENT:   Head: Normocephalic and atraumatic. Right Ear: External ear normal.   Left Ear: External ear normal.   Eyes: Pupils are equal, round, and reactive to light. Conjunctivae and EOM are normal. No scleral icterus. Neck: Neck supple. 0.15 05/15/2019    BAABSO 0.08 05/15/2019     Lab Results   Component Value Date     (H) 05/22/2020    BUN 13 05/22/2020    BUNCREA 8.1 (L) 05/22/2020    CREATSERUM 1.60 (H) 05/22/2020    ANIONGAP 3 05/22/2020    GFR 44 (L) 01/02/2018    GFRNAA 42 (

## 2021-01-28 ENCOUNTER — TELEPHONE (OUTPATIENT)
Dept: FAMILY MEDICINE CLINIC | Facility: CLINIC | Age: 76
End: 2021-01-28

## 2021-03-15 ENCOUNTER — OFFICE VISIT (OUTPATIENT)
Dept: FAMILY MEDICINE CLINIC | Facility: CLINIC | Age: 76
End: 2021-03-15
Payer: MEDICARE

## 2021-03-15 VITALS
OXYGEN SATURATION: 99 % | RESPIRATION RATE: 16 BRPM | BODY MASS INDEX: 24.9 KG/M2 | SYSTOLIC BLOOD PRESSURE: 122 MMHG | HEART RATE: 73 BPM | WEIGHT: 172 LBS | DIASTOLIC BLOOD PRESSURE: 84 MMHG | TEMPERATURE: 98 F | HEIGHT: 69.5 IN

## 2021-03-15 DIAGNOSIS — M10.40 OTHER SECONDARY ACUTE GOUT, UNSPECIFIED SITE: ICD-10-CM

## 2021-03-15 DIAGNOSIS — Z87.11 HISTORY OF PEPTIC ULCER DISEASE: ICD-10-CM

## 2021-03-15 DIAGNOSIS — Z51.81 MEDICATION MONITORING ENCOUNTER: ICD-10-CM

## 2021-03-15 DIAGNOSIS — R04.0 EPISTAXIS: ICD-10-CM

## 2021-03-15 DIAGNOSIS — Z86.79 HISTORY OF VENTRICULAR TACHYCARDIA: ICD-10-CM

## 2021-03-15 DIAGNOSIS — Z86.69 HISTORY OF RETINAL DETACHMENT: ICD-10-CM

## 2021-03-15 DIAGNOSIS — M1A.9XX1 GOUT WITH TOPHI: ICD-10-CM

## 2021-03-15 DIAGNOSIS — Z00.00 ENCOUNTER FOR ANNUAL HEALTH EXAMINATION: Primary | ICD-10-CM

## 2021-03-15 DIAGNOSIS — Z86.79 HISTORY OF ATRIAL FIBRILLATION: ICD-10-CM

## 2021-03-15 DIAGNOSIS — N18.32 CHRONIC RENAL FAILURE, STAGE 3B (HCC): ICD-10-CM

## 2021-03-15 DIAGNOSIS — R73.01 IMPAIRED FASTING GLUCOSE: ICD-10-CM

## 2021-03-15 DIAGNOSIS — E78.00 HYPERCHOLESTEROLEMIA: ICD-10-CM

## 2021-03-15 DIAGNOSIS — N28.1 RENAL CYSTS, ACQUIRED, BILATERAL: ICD-10-CM

## 2021-03-15 DIAGNOSIS — M1A.0791 IDIOPATHIC CHRONIC GOUT OF FOOT WITH TOPHUS, UNSPECIFIED LATERALITY: ICD-10-CM

## 2021-03-15 PROBLEM — Z98.41 HISTORY OF BILATERAL CATARACT EXTRACTION: Status: ACTIVE | Noted: 2021-03-15

## 2021-03-15 PROBLEM — Z98.42 HISTORY OF BILATERAL CATARACT EXTRACTION: Status: ACTIVE | Noted: 2021-03-15

## 2021-03-15 PROCEDURE — 96160 PT-FOCUSED HLTH RISK ASSMT: CPT | Performed by: FAMILY MEDICINE

## 2021-03-15 PROCEDURE — 99397 PER PM REEVAL EST PAT 65+ YR: CPT | Performed by: FAMILY MEDICINE

## 2021-03-15 PROCEDURE — 3079F DIAST BP 80-89 MM HG: CPT | Performed by: FAMILY MEDICINE

## 2021-03-15 PROCEDURE — G0439 PPPS, SUBSEQ VISIT: HCPCS | Performed by: FAMILY MEDICINE

## 2021-03-15 PROCEDURE — 3008F BODY MASS INDEX DOCD: CPT | Performed by: FAMILY MEDICINE

## 2021-03-15 PROCEDURE — 3074F SYST BP LT 130 MM HG: CPT | Performed by: FAMILY MEDICINE

## 2021-03-15 NOTE — PATIENT INSTRUCTIONS
Brian Sandoval's SCREENING SCHEDULE   Tests on this list are recommended by your physician but may not be covered, or covered at this frequency, by your insurer. Please check with your insurance carrier before scheduling to verify coverage.     Bandar Rueda Covered up to Age 76     Colonoscopy Screen   Covered every 10 years- more often if abnormal There are no preventive care reminders to display for this patient.  Update Health Maintenance if applicable    Flex Sigmoidoscopy Screen  Covered every 5 years No cut with metal- TD and TDaP Not covered by Medicare Part B) No orders found for this or any previous visit.  This may be covered with your prescription benefits, but Medicare does not cover unless Medically needed    Zoster (Not covered by Medicare Part B)

## 2021-03-15 NOTE — PROGRESS NOTES
HPI:   Gretchen Magaña is a 76year old male who presents for a MA (Medicare Advantage) Supervisit (Once per calendar year).         His last annual assessment has been over 1 year: Annual Physical due on 01/23/2021       Was in [place] visiting daughter whe Never saw nephrology. 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 Mirian Daniel. On 50 mg allopurinol daily. Last uric acid 6.3. Impaired fasting glucose discussed today as prediabetic, pt not aware of it.       History of ventricular tachycar tobacco. He reports current alcohol use. He reports that he does not use drugs. REVIEW OF SYSTEMS:   neg except HPI. No nocturia. No GI sx. No gout x 14 months. Never headache  No psych issues.        EXAM:   /84   Pulse 73   Temp 98.2 °F ( History of ventricular tachycardia after PUD surgery years ago    History of atrial fibrillation after PUD surgery years ago.      Chronic renal failure, stage 3b-- stable several years    Hypercholesterolemia--not needing treatment, check lipid levels for this or any previous visit. No flowsheet data found. Fecal Occult Blood Annually Occult Blood Result (no units)   Date Value   01/19/2018 Negative for Occult Blood    No flowsheet data found.     Glaucoma Screening      Ophthalmology Visit Annually: Least  Annually for Diabetics HgbA1C (%)   Date Value   02/12/2020 6.0 (H)    No flowsheet data found.     Fasting Blood Sugar (FSB)   Patient must be diagnosed with one of the following:   • Hypertension   • Dyslipidemia   • Obesity (BMI ³30 kg/m2)   • Pre units)   Date Value   01/19/2018 Negative for Occult Blood    No flowsheet data found.      Barium Enema-   uncomfortable but covered  Covered but uncomfortable   Glaucoma Screening      Ophthalmology Visit   Covered annually for Diabetics, people with Glau for Advanced Directives    [employer]. org/publications/Documents/personal_dec. pdf  An information packet, including necessary form from the [employer] website. http://www. [place]ph.state. [place]/public/books/advin.htm  A link to the I

## 2021-03-29 DIAGNOSIS — M1A.9XX1 TOPHACEOUS GOUT: ICD-10-CM

## 2021-03-29 DIAGNOSIS — E79.0 HYPERURICEMIA: ICD-10-CM

## 2021-03-29 RX ORDER — ALLOPURINOL 100 MG/1
TABLET ORAL
Qty: 90 TABLET | Refills: 1 | Status: SHIPPED | OUTPATIENT
Start: 2021-03-29 | End: 2021-10-04

## 2021-04-06 ENCOUNTER — LAB ENCOUNTER (OUTPATIENT)
Dept: LAB | Facility: HOSPITAL | Age: 76
End: 2021-04-06
Attending: INTERNAL MEDICINE
Payer: MEDICARE

## 2021-04-06 DIAGNOSIS — M1A.9XX1 TOPHACEOUS GOUT: ICD-10-CM

## 2021-04-06 DIAGNOSIS — Z51.81 MEDICATION MONITORING ENCOUNTER: ICD-10-CM

## 2021-04-06 DIAGNOSIS — N28.1 RENAL CYSTS, ACQUIRED, BILATERAL: ICD-10-CM

## 2021-04-06 DIAGNOSIS — Z86.79 HISTORY OF ATRIAL FIBRILLATION: ICD-10-CM

## 2021-04-06 DIAGNOSIS — E78.00 HYPERCHOLESTEROLEMIA: ICD-10-CM

## 2021-04-06 DIAGNOSIS — M1A.0791 IDIOPATHIC CHRONIC GOUT OF FOOT WITH TOPHUS, UNSPECIFIED LATERALITY: ICD-10-CM

## 2021-04-06 DIAGNOSIS — Z86.79 HISTORY OF VENTRICULAR TACHYCARDIA: ICD-10-CM

## 2021-04-06 DIAGNOSIS — E79.0 HYPERURICEMIA: ICD-10-CM

## 2021-04-06 DIAGNOSIS — R73.01 IMPAIRED FASTING GLUCOSE: ICD-10-CM

## 2021-04-06 DIAGNOSIS — N18.9 CHRONIC KIDNEY DISEASE, UNSPECIFIED CKD STAGE: ICD-10-CM

## 2021-04-06 PROCEDURE — 36415 COLL VENOUS BLD VENIPUNCTURE: CPT

## 2021-04-06 PROCEDURE — 80061 LIPID PANEL: CPT

## 2021-04-06 PROCEDURE — 85025 COMPLETE CBC W/AUTO DIFF WBC: CPT

## 2021-04-06 PROCEDURE — 83036 HEMOGLOBIN GLYCOSYLATED A1C: CPT

## 2021-04-06 PROCEDURE — 80053 COMPREHEN METABOLIC PANEL: CPT

## 2021-04-06 PROCEDURE — 84550 ASSAY OF BLOOD/URIC ACID: CPT

## 2021-04-09 ENCOUNTER — OFFICE VISIT (OUTPATIENT)
Dept: RHEUMATOLOGY | Facility: CLINIC | Age: 76
End: 2021-04-09
Payer: MEDICARE

## 2021-04-09 VITALS
BODY MASS INDEX: 24.76 KG/M2 | WEIGHT: 171 LBS | HEART RATE: 76 BPM | SYSTOLIC BLOOD PRESSURE: 138 MMHG | TEMPERATURE: 97 F | DIASTOLIC BLOOD PRESSURE: 80 MMHG | HEIGHT: 69.5 IN | RESPIRATION RATE: 16 BRPM

## 2021-04-09 DIAGNOSIS — E79.0 HYPERURICEMIA: ICD-10-CM

## 2021-04-09 DIAGNOSIS — M1A.9XX1 TOPHACEOUS GOUT: Primary | ICD-10-CM

## 2021-04-09 DIAGNOSIS — N18.9 CHRONIC KIDNEY DISEASE, UNSPECIFIED CKD STAGE: ICD-10-CM

## 2021-04-09 DIAGNOSIS — Z13.820 SCREENING FOR OSTEOPOROSIS: ICD-10-CM

## 2021-04-09 PROCEDURE — 99213 OFFICE O/P EST LOW 20 MIN: CPT | Performed by: INTERNAL MEDICINE

## 2021-04-09 PROCEDURE — 3008F BODY MASS INDEX DOCD: CPT | Performed by: INTERNAL MEDICINE

## 2021-04-09 PROCEDURE — 3079F DIAST BP 80-89 MM HG: CPT | Performed by: INTERNAL MEDICINE

## 2021-04-09 PROCEDURE — 3075F SYST BP GE 130 - 139MM HG: CPT | Performed by: INTERNAL MEDICINE

## 2021-04-09 NOTE — PROGRESS NOTES
?  RHEUMATOLOGY Follow up   Date of visit: 4/9/2021  ? Patient presents with:  Gout: 6 month f/u. No gout flares. Taking half tablet of allopurinol daily.      ?  ASSESSMENT, DISCUSSION & PLAN   Assessment:  Tophaceous gout  (primary encounter diagnosis) with other healthcare providers, documenting clinical information in the E HR, independently interpreting results and communicating results to the patient/family/caregiver and care coordination with the patient's other providers.       Plan:  Diagnoses and states this past January, had his worst attack since that time. Pain was so severe, he had to ambulate with a crutch. Cannot think of any specific trigger, with exception of eating frozen okra for two days prior to the flare.  States for this flare, he star Performed by Manpreet Choe MD at [facility] MAIN OR   • OTHER SURGICAL HISTORY  01/12    perforated peptic ulcer, expl lp, washout, Belvie Bones Patch repair     Family History:  History reviewed. No pertinent family history.   Social History:  Social History Body mass index is 24.89 kg/m². Body surface area is 1.94 meters squared. Physical Exam  Vitals and nursing note reviewed. Constitutional:       General: He is not in acute distress. Appearance: Normal appearance. He is well-developed.  He is 04/06/2021    .0 04/06/2021    MCV 94.2 04/06/2021    MCH 31.2 04/06/2021    MCHC 33.1 04/06/2021    RDW 13.4 04/06/2021    NEPRELIM 3.08 04/06/2021    NEPERCENT 55.3 04/06/2021    LYPERCENT 32.4 04/06/2021    MOPERCENT 9.5 04/06/2021    EOPERCENT 1

## 2021-05-25 ENCOUNTER — TELEPHONE (OUTPATIENT)
Dept: FAMILY MEDICINE CLINIC | Facility: CLINIC | Age: 76
End: 2021-05-25

## 2021-05-25 NOTE — TELEPHONE ENCOUNTER
Patient's wife stated they are moving to [place]. Wife has notified ins. Company. negative - no vomiting, no diarrhea

## 2021-10-04 ENCOUNTER — TELEPHONE (OUTPATIENT)
Dept: RHEUMATOLOGY | Facility: CLINIC | Age: 76
End: 2021-10-04

## 2021-10-04 DIAGNOSIS — E79.0 HYPERURICEMIA: ICD-10-CM

## 2021-10-04 DIAGNOSIS — M1A.9XX1 TOPHACEOUS GOUT: ICD-10-CM

## 2021-10-04 RX ORDER — ALLOPURINOL 100 MG/1
TABLET ORAL
Qty: 90 TABLET | Refills: 1 | Status: SHIPPED | OUTPATIENT
Start: 2021-10-04

## 2021-10-04 NOTE — TELEPHONE ENCOUNTER
LOV 4-9-21 (RTC 6 months)  No future appointments. Call pt to check status and schedule f/u appt; lm on vm to cb. Main dept # given.

## 2021-10-04 NOTE — TELEPHONE ENCOUNTER
Pt returned my call; states he does need RF, is feeling okay, has relocated to [facility] near his daughter. He is aware he will need to establish with Rheum there.

## 2022-03-28 RX ORDER — ALLOPURINOL 100 MG/1
TABLET ORAL
Qty: 90 TABLET | Refills: 1 | OUTPATIENT
Start: 2022-03-28

## 2022-11-20 ENCOUNTER — TELEPHONE (OUTPATIENT)
Dept: FAMILY MEDICINE CLINIC | Facility: CLINIC | Age: 77
End: 2022-11-20

## 2022-11-20 NOTE — TELEPHONE ENCOUNTER
Pt moved to [address] [place] [address] . Removal filled . Pt was instructed to call ins to change pcp .

## (undated) DIAGNOSIS — M1A.9XX1 TOPHACEOUS GOUT: ICD-10-CM

## (undated) DIAGNOSIS — E79.0 HYPERURICEMIA: ICD-10-CM

## (undated) NOTE — LETTER
07/30/19        [facility]      Dear Belle Dawson,    [facility] records indicate that you have outstanding lab work and or testing that was ordered for you and has not yet been completed:  Orders Placed This Encounte

## (undated) NOTE — LETTER
01/16/18        [facility]      Dear Amanda Villa,    [facility] records indicate that you have outstanding lab work and or testing that was ordered for you and has not yet been completed:           Fecal Occult Blood

## (undated) NOTE — Clinical Note
Dr. Yasir Menchaca! Thank you for referring Mr. Giorgio Xie for rheumatologic evaluation. Please see the discussion portion of my note and let me know if you have any questions.  RANDELL Aguilar [id number]/10/2019

## (undated) NOTE — LETTER
07/30/19        [address]      Dear Ada Escoto,    [id number] [facility] records indicate that you have outstanding lab work and or testing that was ordered for you and has not yet been completed:  Orders Placed This Encounte